# Patient Record
Sex: FEMALE | Race: BLACK OR AFRICAN AMERICAN | Employment: FULL TIME | URBAN - METROPOLITAN AREA
[De-identification: names, ages, dates, MRNs, and addresses within clinical notes are randomized per-mention and may not be internally consistent; named-entity substitution may affect disease eponyms.]

---

## 2017-05-17 ENCOUNTER — HOSPITAL ENCOUNTER (OUTPATIENT)
Dept: PHYSICAL THERAPY | Age: 52
Discharge: HOME | End: 2017-05-17
Payer: COMMERCIAL

## 2017-05-17 PROCEDURE — 97161 PT EVAL LOW COMPLEX 20 MIN: CPT

## 2017-05-17 PROCEDURE — 97140 MANUAL THERAPY 1/> REGIONS: CPT

## 2017-05-17 NOTE — PROGRESS NOTES
Oma Phan  : 1965 [facility] at [address]  [phone]   [phone]        OUTPATIENT PHYSICAL THERAPY:Initial Assessment 2017    ICD-10: Treatment Diagnosis:   Pain in RIGHT Leg (M79.604)  Pain in LEFT Leg (M79.605)  Difficulty in walking, not elsewhere classified (R26.2)  Precautions/Allergies:   Norvasc [amlodipine] and Pcn [penicillins]   Fall Risk Score: 1 (? 5 = High Risk)  MD Orders: Evaluate and treat MEDICAL/REFERRING DIAGNOSIS:  Spondylolisthesis, site unspecified [M43.10]  Spinal stenosis, lumbar region [M48.06]   DATE OF ONSET: 17, symptoms since 2016  REFERRING PHYSICIAN: Fercho George  RETURN PHYSICIAN APPOINTMENT: May 30, 2017   Patient has attended 1 physical therapy session including initial evaluation. INITIAL ASSESSMENT:  Ms. Walterine Meigs presents with increased pain bilateral hamstring musculature during sit to stand transfer. She notes pain is slowly improving. She presents with postural deviations listed below. She notes decreased pain with manual therapy to bilateral gluteus minims and hamstring musculature this date. She would benefit from skilled physical therapy in order to restore prior level of function and prevent future impairment. PROBLEM LIST (Impacting functional limitations):  1. Decreased Transfer Abilities  2. Decreased Ambulation Ability/Technique  3. Decreased Balance  4. Increased Pain  5. Decreased Activity Tolerance  6. Decreased Pacing Skills  7. Decreased Work Simplification/Energy Conservation Techniques  8. Decreased Flexibility/Joint Mobility  9. Decreased [place] with Home Exercise Program INTERVENTIONS PLANNED:  1. Balance Exercise  2. Bed Mobility  3. Cold  4. Electrical Stimulation  5. Family Education  6. Gait Training  7. Heat  8. Home Exercise Program (HEP)  9. Manual Therapy  10.  Neuromuscular Re-education/Strengthening  11. Range of Motion (ROM)  12. Therapeutic Activites  13. Therapeutic Exercise/Strengthening  14. Transfer Training  15. body mechanics training   TREATMENT PLAN:  Effective Dates: 5/17/17 TO 8/17/17. Frequency/Duration: 2 times a week for 6 weeks  GOALS: (Goals have been discussed and agreed upon with patient.)  Discharge Goals: Time Frame: 6 weeks  1. Patient will be independent with home exercise program within 6 weeks in order to improve independence with management of patient's symptoms and/or functional deficits. 2. Patient will improve their Modified Oswestry Low Back Pain Questionnaire score by 6 points (9/50) within 6 weeks in order to demonstrate a minimally clinically important difference with function during instrumental activities of daily living and mobility. 3. Patient will improve their Lower Extremity Functional Scale score by 9 scale points (53/80) within 6 weeks in order to demonstrate a minimally clinically important difference with improved function during instrumental activities of daily living and mobility. Rehabilitation Potential For Stated Goals: Good  Regarding [address] Hagen's therapy, I certify that the treatment plan above will be carried out by a therapist or under their direction. Thank you for this referral,  Favian Soliman PT     Referring Physician Signature: ELI Kelley              Date                    The information in this section was collected on 5/17/17 (except where otherwise noted). HISTORY:   History of Present Injury/Illness (Reason for Referral): Jesús Coy notes rash on face. She took spironolactone for rash. Notes decreased strength, tingling in bilateral hands, and pain in bilateral legs and back.    Past Medical History/Comorbidities: hypertension, uterine fibroid, hysterectomy, scoliosis  Social History/Living Environment:   Jesús Coy lives alone, she has 2 steps to enter house, one story house  Prior Level of Function/Work/Activity:  Waldemar Ramos works full time, job requirements prolonged sitting, computer use, walking  Personal Factors:          Sex:  female        Age:  45 y.o. Current Medications:  Lisinopril-hydrochlorothiazide, zithromax, aleve  Date Last Reviewed:  5/17/2017   Number of Personal Factors/Comorbidities that affect the Plan of Care: 1-2: MODERATE COMPLEXITY   EXAMINATION:   Observation/Orthostatic Postural Assessment:  Assessed 5/17/17  Waldemar Ramos sits with forward head and rounded shoulders which indicate tight anterior chest musculature, upper trapezius, and levator scapula and weak posterior scapula musculature and deep cervical flexors. She stands with weight shifted to left lower extremity with right knee flexed. Symmetrical pelvic landmarks in standing.  Right lower extremity longer in supine and long sitting   Palpation:  Assessed 5/17/17   Jesús Street is tender to palpate bilateral gluteus minimus, bilateral medial hamstring musculature, and right lateral hamstring musculature  ROM:  Assessed 5/17/17  Will continue to assess in more detail - within functional limits lumbar region and bilateral lower extremities   Strength:  Assessed 5/17/17  4/5 all major muscle groups bilateral lower extremities   Special Tests:  Assessed 5/17/17    slump test - negative bilaterally  Straight leg raise - negative bilaterally   Neurological Screen: Assessed 5/17/17        Myotomes: strong and symmetrical bilateral lower extremity         Dermatomes:  No numbness or tingling noted bilateral lower extremity         Reflexes: unable to elicit bilateral quadriceps and achilles tendon  Functional Mobility: Assessed 5/17/17 Matt [address] is modified independent with sit to stand transfer due to increased pain bilateral posterior thighs  Balance:  Assessed 5/17/17 good dynamic standing balance Mental Status:  Assessed 5/17/17  Alert and oriented x 4  Vitals: Assessed 5/17/17     127/81mmHg      Body Structures Involved:  1. Joints  2. Muscles  3. Ligaments Body Functions Affected:  1. Sensory/Pain  2. Neuromusculoskeletal  3. Movement Related  4. Skin Related Activities and Participation Affected:  1. General Tasks and Demands  2. Mobility  3. Self Care  4. Domestic Life  5. Interpersonal Interactions and Relationships  6. Community, Social and [place]   Number of elements that affect the Plan of Care: 3: MODERATE COMPLEXITY   CLINICAL PRESENTATION:   Presentation: Stable and uncomplicated: LOW COMPLEXITY   CLINICAL DECISION MAKING:   Outcome Measure: Assessed 5/17/17  Tool Used: Lower Extremity Functional Scale (LEFS)  Score:  Initial: 44/80    Interpretation of Score: 20 questions each scored on a 5 point scale with 0 representing \"extreme difficulty or unable to perform\" and 4 representing \"no difficulty\". The lower the score, the greater the functional disability. 80/80 represents no disability. Minimal detectable change is 9 points. Score 80 79-63 62-48 47-32 31-16 15-1 0   Modifier CH CI CJ CK CL CM CN     Tool Used: Modified Oswestry Low Back Pain Questionnaire  Score:  Initial: 15/50     Interpretation of Score: Each section is scored on a 0-5 scale, 5 representing the greatest disability. The scores of each section are added together for a total score of 50. Score 0 1-10 11-20 21-30 31-40 41-49 50   Modifier CH CI CJ CK CL CM CN     Medical Necessity:   · Patient is expected to demonstrate progress in range of motion, coordination and functional technique to increase independence with pain free functional mobility. · Patient demonstrates good rehab potential due to higher previous functional level. Reason for Services/Other Comments:  · Patient continues to require skilled intervention due to increased pain with sit to stand transfer.    Use of outcome tool(s) and clinical judgement create a POC that gives a: Clear prediction of patient's progress: LOW COMPLEXITY            TREATMENT:   (In addition to Assessment/Re-Assessment sessions the following treatments were rendered)  Pre-treatment Symptoms/Complaints: Arabella Jarrett notes 7/10 pain bilateral posterior thighs with sit to stand transfer. Pain: Initial:   Pain Intensity 1: 7  Pain Location 1: Hip, Leg  Pain Orientation 1: Right, Left, Lower  Pain Intervention(s) 1: Exercise, Therapeutic touch     Evaluation: 35 minutes    (In addition to Assessment/Re-Assessment sessions the following treatments were rendered)  Manual Therapy (    Soft Tissue Mobilization Duration  Duration: 11 Minutes):  Functional positional release to bilateral gluteus minimus, hamstring musculature for improved mobility and decreased pain   Soft tissue mobilization to bilateral gluteal musculature and hamstring musculature with foam roller for improved mobility and decreased pain     Therapeutic Modalities:                                                                                                 Treatment/Session Assessment:    Response to Treatment:  Arabella Jarrett notes 4/10 pain in posterior bilateral lower extremities following manual therapy this date  Pain: Post Treatment Session: 4/10  · Compliance with Program/Exercises: Will assess as treatment progresses. · Recommendations/Intent for next treatment session: \"Next visit will focus on advancements to more challenging activities and manual therapy for improved mobility and decreased pain \".   Total Treatment Duration:  PT Patient Time In/Time Out  Time In: 1440  Time Out: Monique 91, PT

## 2017-05-17 NOTE — PROGRESS NOTES
Ambulatory/Rehab Services H2 Model Falls Risk Assessment    Risk Factor Pts. ·   Confusion/Disorientation/Impulsivity  []    4 ·   Symptomatic Depression  []   2 ·   Altered Elimination  []   1 ·   Dizziness/Vertigo  []   1 ·   Gender (Male)  []   1 ·   Any administered antiepileptics (anticonvulsants):  []   2 ·   Any administered benzodiazepines:  []   1 ·   Visual Impairment (specify):  []   1 ·   Portable Oxygen Use  []   1 ·   Orthostatic ? BP  []   1 ·   History of Recent Falls (within 3 mos.)  []   5     Ability to Rise from Chair (choose one) Pts. ·   Ability to rise in a single movement  []   0 ·   Pushes up, successful in one attempt  [x]   1 ·   Multiple attempts, but successful  []   3 ·   Unable to rise without assistance  []   4   Total: (5 or greater = High Risk) 1     Falls Prevention Plan:   []                Physical Limitations to Exercise (specify):   []                Mobility Assistance Device (type):   []                Exercise/Equipment Adaptation (specify):    ©2010 [facility] of [address] Patent #[id number].  Federal Law prohibits the replication, distribution or use without written permission from [facility] of [address] [place] [address]  5/17/2017

## 2017-05-22 ENCOUNTER — HOSPITAL ENCOUNTER (OUTPATIENT)
Dept: PHYSICAL THERAPY | Age: 52
Discharge: HOME | End: 2017-05-22
Payer: COMMERCIAL

## 2017-05-22 PROCEDURE — 97110 THERAPEUTIC EXERCISES: CPT

## 2017-05-22 PROCEDURE — 97140 MANUAL THERAPY 1/> REGIONS: CPT

## 2017-05-22 NOTE — PROGRESS NOTES
Anjali Aceves  : 1965 [facility] at [place] [address] [address] [facility]  [phone]   [phone]        OUTPATIENT PHYSICAL THERAPY: Daily Note 2017    ICD-10: Treatment Diagnosis:   Pain in RIGHT Leg (M79.604)  Pain in LEFT Leg (M79.605)  Difficulty in walking, not elsewhere classified (R26.2)  Precautions/Allergies:   Norvasc [amlodipine] and Pcn [penicillins]   Fall Risk Score: 1 (? 5 = High Risk)  MD Orders: Evaluate and treat MEDICAL/REFERRING DIAGNOSIS:  Spondylolisthesis, site unspecified [M43.10]  Spinal stenosis, lumbar region [M48.06]   DATE OF ONSET: 17, symptoms since 2016  REFERRING PHYSICIAN: Poli Colemanma  RETURN PHYSICIAN APPOINTMENT: May 30, 2017   Patient has attended 2 physical therapy sessions including initial evaluation. INITIAL ASSESSMENT:  Ms. Velez Mo presents with increased pain bilateral hamstring musculature during sit to stand transfer. She notes pain is slowly improving. She presents with postural deviations listed below. She notes decreased pain with manual therapy to bilateral gluteus minims and hamstring musculature this date. She would benefit from skilled physical therapy in order to restore prior level of function and prevent future impairment. PROBLEM LIST (Impacting functional limitations):  1. Decreased Transfer Abilities  2. Decreased Ambulation Ability/Technique  3. Decreased Balance  4. Increased Pain  5. Decreased Activity Tolerance  6. Decreased Pacing Skills  7. Decreased Work Simplification/Energy Conservation Techniques  8. Decreased Flexibility/Joint Mobility  9. Decreased [place] with Home Exercise Program INTERVENTIONS PLANNED:  1. Balance Exercise  2. Bed Mobility  3. Cold  4. Electrical Stimulation  5. Family Education  6. Gait Training  7. Heat  8. Home Exercise Program (HEP)  9. Manual Therapy  10. Neuromuscular Re-education/Strengthening  11.  Range of Motion (ROM)  12. Therapeutic Activites  13. Therapeutic Exercise/Strengthening  14. Transfer Training  15. body mechanics training   TREATMENT PLAN:  Effective Dates: 5/17/17 TO 8/17/17. Frequency/Duration: 2 times a week for 6 weeks  GOALS: (Goals have been discussed and agreed upon with patient.)  Discharge Goals: Time Frame: 6 weeks  1. Patient will be independent with home exercise program within 6 weeks in order to improve independence with management of patient's symptoms and/or functional deficits. 2. Patient will improve their Modified Oswestry Low Back Pain Questionnaire score by 6 points (9/50) within 6 weeks in order to demonstrate a minimally clinically important difference with function during instrumental activities of daily living and mobility. 3. Patient will improve their Lower Extremity Functional Scale score by 9 scale points (53/80) within 6 weeks in order to demonstrate a minimally clinically important difference with improved function during instrumental activities of daily living and mobility. Rehabilitation Potential For Stated Goals: Good               The information in this section was collected on 5/17/17 (except where otherwise noted). HISTORY:   History of Present Injury/Illness (Reason for Referral): Jericatony Rhodesisaura [address] [place] [address] notes rash on face. She took spironolactone for rash. Notes decreased strength, tingling in bilateral hands, and pain in bilateral legs and back. Past Medical History/Comorbidities: hypertension, uterine fibroid, hysterectomy, scoliosis  Social History/Living Environment:   Travis Torres lives alone, she has 2 steps to enter house, one story house  Prior Level of Function/Work/Activity:  Travis Torres works full time, job requirements prolonged sitting, computer use, walking  Personal Factors:          Sex:  female        Age:  45 y.o.   Current Medications:  Lisinopril-hydrochlorothiazide, zithromax, aleve  Date Last Reviewed:  5/22/2017   Number of Personal Factors/Comorbidities that affect the Plan of Care: 1-2: MODERATE COMPLEXITY   EXAMINATION:   Observation/Orthostatic Postural Assessment:  Assessed 5/17/17  Arden Babcock sits with forward head and rounded shoulders which indicate tight anterior chest musculature, upper trapezius, and levator scapula and weak posterior scapula musculature and deep cervical flexors. She stands with weight shifted to left lower extremity with right knee flexed. Symmetrical pelvic landmarks in standing. Right lower extremity longer in supine and long sitting   Palpation:  Assessed 5/17/17   Jesús Byrd is tender to palpate bilateral gluteus minimus, bilateral medial hamstring musculature, and right lateral hamstring musculature  ROM:  Assessed 5/17/17  Will continue to assess in more detail - within functional limits lumbar region and bilateral lower extremities   Strength:  Assessed 5/17/17  4/5 all major muscle groups bilateral lower extremities   Special Tests:  Assessed 5/17/17    slump test - negative bilaterally  Straight leg raise - negative bilaterally   Neurological Screen: Assessed 5/17/17        Myotomes: strong and symmetrical bilateral lower extremity         Dermatomes:  No numbness or tingling noted bilateral lower extremity         Reflexes: unable to elicit bilateral quadriceps and achilles tendon  Functional Mobility: Assessed 5/17/17 Jesús Byrd is modified independent with sit to stand transfer due to increased pain bilateral posterior thighs  Balance:  Assessed 5/17/17 good dynamic standing balance   Mental Status:  Assessed 5/17/17  Alert and oriented x 4  Vitals: Assessed 5/17/17     127/81mmHg      Body Structures Involved:  1. Joints  2. Muscles  3. Ligaments Body Functions Affected:  1. Sensory/Pain  2. Neuromusculoskeletal  3. Movement Related  4.  Skin Related Activities and Participation Affected:  1. General Tasks and Demands  2. Mobility  3. Self Care  4. Domestic Life  5. Interpersonal Interactions and Relationships  6. Community, Social and [place]   Number of elements that affect the Plan of Care: 3: MODERATE COMPLEXITY   CLINICAL PRESENTATION:   Presentation: Stable and uncomplicated: LOW COMPLEXITY   CLINICAL DECISION MAKING:   Outcome Measure: Assessed 5/17/17  Tool Used: Lower Extremity Functional Scale (LEFS)  Score:  Initial: 44/80    Interpretation of Score: 20 questions each scored on a 5 point scale with 0 representing \"extreme difficulty or unable to perform\" and 4 representing \"no difficulty\". The lower the score, the greater the functional disability. 80/80 represents no disability. Minimal detectable change is 9 points. Score 80 79-63 62-48 47-32 31-16 15-1 0   Modifier CH CI CJ CK CL CM CN     Tool Used: Modified Oswestry Low Back Pain Questionnaire  Score:  Initial: 15/50     Interpretation of Score: Each section is scored on a 0-5 scale, 5 representing the greatest disability. The scores of each section are added together for a total score of 50. Score 0 1-10 11-20 21-30 31-40 41-49 50   Modifier CH CI CJ CK CL CM CN     Medical Necessity:   · Patient is expected to demonstrate progress in range of motion, coordination and functional technique to increase independence with pain free functional mobility. · Patient demonstrates good rehab potential due to higher previous functional level. Reason for Services/Other Comments:  · Patient continues to require skilled intervention due to increased pain with sit to stand transfer. Use of outcome tool(s) and clinical judgement create a POC that gives a: Clear prediction of patient's progress: LOW COMPLEXITY            TREATMENT:   (In addition to Assessment/Re-Assessment sessions the following treatments were rendered)  Pre-treatment Symptoms/Complaints:   Jorge Orantes notes 8/10 pain bilateral posterior thighs with sit to stand transfer. (right more than left)  Pain: Initial:   Pain Intensity 1: 8  Pain Location 1: Leg  Pain Orientation 1: Right  Pain Intervention(s) 1: Exercise, Therapeutic touch     (In addition to Assessment/Re-Assessment sessions the following treatments were rendered)  Therapeutic Exercise: (27 Minutes):  Exercises per grid below to improve mobility, strength and coordination. Required minimal visual and verbal cues to promote proper body alignment and promote proper body posture. Progressed complexity of movement as indicated. 1. Supine single knee to chest for improved mobility and decreased pain 3 sets 30 seconds bilaterally    2. Supine piriformis stretch foot on opposite knee 3 sets 30 seconds bilaterally     3. Supine bridges for improved strength 2 sets of 10 repetitions    4. NuStep for close chain strengthening bilateral lower extremities 8 minutes level 4    5. Body mechanics with sitting posture when at work - lumbar support, sit back against chair (not on edge), feet on floor    Manual Therapy (    Soft Tissue Mobilization Duration  Duration: 13 Minutes):  Functional positional release to bilateral gluteus minimus, hamstring musculature for improved mobility and decreased pain   Soft tissue mobilization to bilateral gluteal musculature and hamstring musculature with foam roller for improved mobility and decreased pain     Therapeutic Modalities:                                                                                                 Treatment/Session Assessment:    Response to Treatment:  Aleisha Rayo notes 6/10 pain in posterior bilateral lower extremities with improved sit to stand transfer  Pain: Post Treatment Session: 6/10  · Compliance with Program/Exercises: Will assess as treatment progresses. · Recommendations/Intent for next treatment session:  \"Next visit will focus on advancements to more challenging activities and manual therapy for improved mobility and decreased pain \".   Total Treatment Duration:  PT Patient Time In/Time Out  Time In: 1440  Time Out: 91 Natalieta Umu, PT

## 2017-05-25 ENCOUNTER — HOSPITAL ENCOUNTER (OUTPATIENT)
Dept: PHYSICAL THERAPY | Age: 52
Discharge: HOME | End: 2017-05-25
Payer: COMMERCIAL

## 2017-05-25 PROCEDURE — 97140 MANUAL THERAPY 1/> REGIONS: CPT

## 2017-05-25 NOTE — PROGRESS NOTES
Magda Zaidi  : 1965 [facility] [address]  [phone]   [phone]        OUTPATIENT PHYSICAL THERAPY: Daily Note 2017    ICD-10: Treatment Diagnosis:   Pain in RIGHT Leg (M79.604)  Pain in LEFT Leg (M79.605)  Difficulty in walking, not elsewhere classified (R26.2)  Precautions/Allergies:   Norvasc [amlodipine] and Pcn [penicillins]   Fall Risk Score: 1 (? 5 = High Risk)  MD Orders: Evaluate and treat MEDICAL/REFERRING DIAGNOSIS:  Spondylolisthesis, site unspecified [M43.10]  Spinal stenosis, lumbar region [M48.06]   DATE OF ONSET: 17, symptoms since 2016  REFERRING PHYSICIAN: Fercho Dos Santos  RETURN PHYSICIAN APPOINTMENT: May 30, 2017   Patient has attended 2 physical therapy sessions including initial evaluation. INITIAL ASSESSMENT:  Ms. Hernandez Escobar presents with increased pain bilateral hamstring musculature during sit to stand transfer. She notes pain is slowly improving. She presents with postural deviations listed below. She notes decreased pain with manual therapy to bilateral gluteus minims and hamstring musculature this date. She would benefit from skilled physical therapy in order to restore prior level of function and prevent future impairment. PROBLEM LIST (Impacting functional limitations):  1. Decreased Transfer Abilities  2. Decreased Ambulation Ability/Technique  3. Decreased Balance  4. Increased Pain  5. Decreased Activity Tolerance  6. Decreased Pacing Skills  7. Decreased Work Simplification/Energy Conservation Techniques  8. Decreased Flexibility/Joint Mobility  9. Decreased [place] with Home Exercise Program INTERVENTIONS PLANNED:  1. Balance Exercise  2. Bed Mobility  3. Cold  4. Electrical Stimulation  5. Family Education  6. Gait Training  7. Heat  8. Home Exercise Program (HEP)  9. Manual Therapy  10. Neuromuscular Re-education/Strengthening  11.  Range of Motion (ROM)  12. Therapeutic Activites  13. Therapeutic Exercise/Strengthening  14. Transfer Training  15. body mechanics training   TREATMENT PLAN:  Effective Dates: 5/17/17 TO 8/17/17. Frequency/Duration: 2 times a week for 6 weeks  GOALS: (Goals have been discussed and agreed upon with patient.)  Discharge Goals: Time Frame: 6 weeks  1. Patient will be independent with home exercise program within 6 weeks in order to improve independence with management of patient's symptoms and/or functional deficits. 2. Patient will improve their Modified Oswestry Low Back Pain Questionnaire score by 6 points (9/50) within 6 weeks in order to demonstrate a minimally clinically important difference with function during instrumental activities of daily living and mobility. 3. Patient will improve their Lower Extremity Functional Scale score by 9 scale points (53/80) within 6 weeks in order to demonstrate a minimally clinically important difference with improved function during instrumental activities of daily living and mobility. Rehabilitation Potential For Stated Goals: Good               The information in this section was collected on 5/17/17 (except where otherwise noted). HISTORY:   History of Present Injury/Illness (Reason for Referral): Jesús Andisaura [address] [place] [address] notes rash on face. She took spironolactone for rash. Notes decreased strength, tingling in bilateral hands, and pain in bilateral legs and back. Past Medical History/Comorbidities: hypertension, uterine fibroid, hysterectomy, scoliosis  Social History/Living Environment:   Radha Gorman lives alone, she has 2 steps to enter house, one story house  Prior Level of Function/Work/Activity:  Radha Gorman works full time, job requirements prolonged sitting, computer use, walking  Personal Factors:          Sex:  female        Age:  45 y.o.   Current Medications:  Lisinopril-hydrochlorothiazide, zithromax, aleve  Date Last Reviewed:  5/25/2017   Number of Personal Factors/Comorbidities that affect the Plan of Care: 1-2: MODERATE COMPLEXITY   EXAMINATION:   Observation/Orthostatic Postural Assessment:  Assessed 5/17/17  Arabella Jarrett sits with forward head and rounded shoulders which indicate tight anterior chest musculature, upper trapezius, and levator scapula and weak posterior scapula musculature and deep cervical flexors. She stands with weight shifted to left lower extremity with right knee flexed. Symmetrical pelvic landmarks in standing. Right lower extremity longer in supine and long sitting   Palpation:  Assessed 5/17/17   Jesús Carreon is tender to palpate bilateral gluteus minimus, bilateral medial hamstring musculature, and right lateral hamstring musculature  ROM:  Assessed 5/17/17  Will continue to assess in more detail - within functional limits lumbar region and bilateral lower extremities   Strength:  Assessed 5/17/17  4/5 all major muscle groups bilateral lower extremities   Special Tests:  Assessed 5/17/17    slump test - negative bilaterally  Straight leg raise - negative bilaterally   Neurological Screen: Assessed 5/17/17        Myotomes: strong and symmetrical bilateral lower extremity         Dermatomes:  No numbness or tingling noted bilateral lower extremity         Reflexes: unable to elicit bilateral quadriceps and achilles tendon  Functional Mobility: Assessed 5/17/17 Jesús Carreon is modified independent with sit to stand transfer due to increased pain bilateral posterior thighs  Balance:  Assessed 5/17/17 good dynamic standing balance   Mental Status:  Assessed 5/17/17  Alert and oriented x 4  Vitals: Assessed 5/17/17     127/81mmHg      Body Structures Involved:  1. Joints  2. Muscles  3. Ligaments Body Functions Affected:  1. Sensory/Pain  2. Neuromusculoskeletal  3. Movement Related  4.  Skin Related Activities and Participation Affected:  1. General Tasks and Demands  2. Mobility  3. Self Care  4. Domestic Life  5. Interpersonal Interactions and Relationships  6. Community, Social and [place]   Number of elements that affect the Plan of Care: 3: MODERATE COMPLEXITY   CLINICAL PRESENTATION:   Presentation: Stable and uncomplicated: LOW COMPLEXITY   CLINICAL DECISION MAKING:   Outcome Measure: Assessed 5/17/17  Tool Used: Lower Extremity Functional Scale (LEFS)  Score:  Initial: 44/80    Interpretation of Score: 20 questions each scored on a 5 point scale with 0 representing \"extreme difficulty or unable to perform\" and 4 representing \"no difficulty\". The lower the score, the greater the functional disability. 80/80 represents no disability. Minimal detectable change is 9 points. Score 80 79-63 62-48 47-32 31-16 15-1 0   Modifier CH CI CJ CK CL CM CN     Tool Used: Modified Oswestry Low Back Pain Questionnaire  Score:  Initial: 15/50     Interpretation of Score: Each section is scored on a 0-5 scale, 5 representing the greatest disability. The scores of each section are added together for a total score of 50. Score 0 1-10 11-20 21-30 31-40 41-49 50   Modifier CH CI CJ CK CL CM CN     Medical Necessity:   · Patient is expected to demonstrate progress in range of motion, coordination and functional technique to increase independence with pain free functional mobility. · Patient demonstrates good rehab potential due to higher previous functional level. Reason for Services/Other Comments:  · Patient continues to require skilled intervention due to increased pain with sit to stand transfer. Use of outcome tool(s) and clinical judgement create a POC that gives a: Clear prediction of patient's progress: LOW COMPLEXITY            TREATMENT:   (In addition to Assessment/Re-Assessment sessions the following treatments were rendered)  Pre-treatment Symptoms/Complaints:   Jitendra Watts notes 7/10 pain bilateral posterior thighs and buttocks. Patient reports with sit to stand transfer. (right more than left). Patient states that since last visit her pain has been generally decreased and she is able to get up and down with less difficulty. Sitting continues to be irritating. Pain: Initial: 7/10        (In addition to Assessment/Re-Assessment sessions the following treatments were rendered)  Therapeutic Exercise: ( 0 ):  Exercises per grid below to improve mobility, strength and coordination. Required minimal visual and verbal cues to promote proper body alignment and promote proper body posture. Progressed complexity of movement as indicated. 1. Supine single knee to chest for improved mobility and decreased pain 3 sets 30 seconds bilaterally    2. Supine piriformis stretch foot on opposite knee 3 sets 30 seconds bilaterally     3. Supine bridges for improved strength 2 sets of 10 repetitions    4. NuStep for close chain strengthening bilateral lower extremities 8 minutes level 4    5. Body mechanics with sitting posture when at work - lumbar support, sit back against chair (not on edge), feet on floor    Patient instructed to continue with HEP as instructed previous treatments. Manual Therapy (     40 minutes):  Patient prone for STM with hands and quasha tools to bilateral hamstrings, medial thighs, bilateral gluts/piriformis to help decrease soft tissue tightness, improve mobility and decrease pain. Palpable tenderness more in right than left hamstring and piriformis areas and medial right thigh. Decreased tenderness reported during treatment. Therapeutic Modalities: (10 minutes) with patient prone moist heat to bilateral gluts and hamstrings to help increase blood flow and decrease tightness prior to manual. After manual (10 minutes) ice pack to bilateral hamstrings with patient prone to help decrease inflammation, edema and pain.              t Treatment/Session Assessment:    Response to Treatment:  Brian Zhang Patient reported decreased tenderness. Patient able to get up from sitting after treatment without increased pain and walking with bigger steps and faster pace. Continue manual therapy and stretching exercises as patient tolerates. Pain: Post Treatment Session: No number given  · Compliance with Program/Exercises: Will assess as treatment progresses. · Recommendations/Intent for next treatment session: \"Next visit will focus on advancements to more challenging activities and manual therapy for improved mobility and decreased pain \".   Total Treatment Duration:  PT Patient Time In/Time Out  Time In: 1430  Time Out: Nigel Alofrd, PTA

## 2017-05-30 ENCOUNTER — HOSPITAL ENCOUNTER (OUTPATIENT)
Dept: PHYSICAL THERAPY | Age: 52
Discharge: HOME | End: 2017-05-30
Payer: COMMERCIAL

## 2017-05-30 PROCEDURE — 97110 THERAPEUTIC EXERCISES: CPT

## 2017-05-30 PROCEDURE — 97140 MANUAL THERAPY 1/> REGIONS: CPT

## 2017-05-30 NOTE — PROGRESS NOTES
Magda Zaidi  : 1965 [facility] [address]  [phone]   [phone]        OUTPATIENT PHYSICAL THERAPY: Daily Note 2017    ICD-10: Treatment Diagnosis:   Pain in RIGHT Leg (M79.604)  Pain in LEFT Leg (M79.605)  Difficulty in walking, not elsewhere classified (R26.2)  Precautions/Allergies:   Norvasc [amlodipine] and Pcn [penicillins]   Fall Risk Score: 1 (? 5 = High Risk)  MD Orders: Evaluate and treat MEDICAL/REFERRING DIAGNOSIS:  Spondylolisthesis, site unspecified [M43.10]  Spinal stenosis, lumbar region [M48.06]   DATE OF ONSET: 17, symptoms since 2016  REFERRING PHYSICIAN: Fercho Dos Santos  RETURN PHYSICIAN APPOINTMENT: May 30, 2017   Patient has attended 2 physical therapy sessions including initial evaluation. INITIAL ASSESSMENT:  Ms. Hernandez Escobar presents with increased pain bilateral hamstring musculature during sit to stand transfer. She notes pain is slowly improving. She presents with postural deviations listed below. She notes decreased pain with manual therapy to bilateral gluteus minims and hamstring musculature this date. She would benefit from skilled physical therapy in order to restore prior level of function and prevent future impairment. PROBLEM LIST (Impacting functional limitations):  1. Decreased Transfer Abilities  2. Decreased Ambulation Ability/Technique  3. Decreased Balance  4. Increased Pain  5. Decreased Activity Tolerance  6. Decreased Pacing Skills  7. Decreased Work Simplification/Energy Conservation Techniques  8. Decreased Flexibility/Joint Mobility  9. Decreased [place] with Home Exercise Program INTERVENTIONS PLANNED:  1. Balance Exercise  2. Bed Mobility  3. Cold  4. Electrical Stimulation  5. Family Education  6. Gait Training  7. Heat  8. Home Exercise Program (HEP)  9. Manual Therapy  10. Neuromuscular Re-education/Strengthening  11.  Range of Motion (ROM)  12. Therapeutic Activites  13. Therapeutic Exercise/Strengthening  14. Transfer Training  15. body mechanics training   TREATMENT PLAN:  Effective Dates: 5/17/17 TO 8/17/17. Frequency/Duration: 2 times a week for 6 weeks  GOALS: (Goals have been discussed and agreed upon with patient.)  Discharge Goals: Time Frame: 6 weeks  1. Patient will be independent with home exercise program within 6 weeks in order to improve independence with management of patient's symptoms and/or functional deficits. 2. Patient will improve their Modified Oswestry Low Back Pain Questionnaire score by 6 points (9/50) within 6 weeks in order to demonstrate a minimally clinically important difference with function during instrumental activities of daily living and mobility. 3. Patient will improve their Lower Extremity Functional Scale score by 9 scale points (53/80) within 6 weeks in order to demonstrate a minimally clinically important difference with improved function during instrumental activities of daily living and mobility. Rehabilitation Potential For Stated Goals: Good               The information in this section was collected on 5/17/17 (except where otherwise noted). HISTORY:   History of Present Injury/Illness (Reason for Referral): Jericatony Andisaura [address] [place] [address] notes rash on face. She took spironolactone for rash. Notes decreased strength, tingling in bilateral hands, and pain in bilateral legs and back. Past Medical History/Comorbidities: hypertension, uterine fibroid, hysterectomy, scoliosis  Social History/Living Environment:   Elba Hamm lives alone, she has 2 steps to enter house, one story house  Prior Level of Function/Work/Activity:  Elba Job works full time, job requirements prolonged sitting, computer use, walking  Personal Factors:          Sex:  female        Age:  45 y.o.   Current Medications:  Lisinopril-hydrochlorothiazide, zithromax, aleve  Date Last Reviewed:  5/30/2017   Number of Personal Factors/Comorbidities that affect the Plan of Care: 1-2: MODERATE COMPLEXITY   EXAMINATION:   Observation/Orthostatic Postural Assessment:  Assessed 5/17/17  Miesha Kathleen sits with forward head and rounded shoulders which indicate tight anterior chest musculature, upper trapezius, and levator scapula and weak posterior scapula musculature and deep cervical flexors. She stands with weight shifted to left lower extremity with right knee flexed. Symmetrical pelvic landmarks in standing. Right lower extremity longer in supine and long sitting   Palpation:  Assessed 5/17/17   Jesús Campos is tender to palpate bilateral gluteus minimus, bilateral medial hamstring musculature, and right lateral hamstring musculature  ROM:  Assessed 5/17/17  Will continue to assess in more detail - within functional limits lumbar region and bilateral lower extremities   Strength:  Assessed 5/17/17  4/5 all major muscle groups bilateral lower extremities   Special Tests:  Assessed 5/17/17    slump test - negative bilaterally  Straight leg raise - negative bilaterally   Neurological Screen: Assessed 5/17/17        Myotomes: strong and symmetrical bilateral lower extremity         Dermatomes:  No numbness or tingling noted bilateral lower extremity         Reflexes: unable to elicit bilateral quadriceps and achilles tendon  Functional Mobility: Assessed 5/17/17 Jesús Campos is modified independent with sit to stand transfer due to increased pain bilateral posterior thighs  Balance:  Assessed 5/17/17 good dynamic standing balance   Mental Status:  Assessed 5/17/17  Alert and oriented x 4  Vitals: Assessed 5/17/17     127/81mmHg      Body Structures Involved:  1. Joints  2. Muscles  3. Ligaments Body Functions Affected:  1. Sensory/Pain  2. Neuromusculoskeletal  3. Movement Related  4.  Skin Related Activities and Participation Affected:  1. General Tasks and Demands  2. Mobility  3. Self Care  4. Domestic Life  5. Interpersonal Interactions and Relationships  6. Community, Social and [place] Independence   Number of elements that affect the Plan of Care: 3: MODERATE COMPLEXITY   CLINICAL PRESENTATION:   Presentation: Stable and uncomplicated: LOW COMPLEXITY   CLINICAL DECISION MAKING:   Outcome Measure: Assessed 5/17/17  Tool Used: Lower Extremity Functional Scale (LEFS)  Score:  Initial: 44/80    Interpretation of Score: 20 questions each scored on a 5 point scale with 0 representing \"extreme difficulty or unable to perform\" and 4 representing \"no difficulty\". The lower the score, the greater the functional disability. 80/80 represents no disability. Minimal detectable change is 9 points. Score 80 79-63 62-48 47-32 31-16 15-1 0   Modifier CH CI CJ CK CL CM CN     Tool Used: Modified Oswestry Low Back Pain Questionnaire  Score:  Initial: 15/50     Interpretation of Score: Each section is scored on a 0-5 scale, 5 representing the greatest disability. The scores of each section are added together for a total score of 50. Score 0 1-10 11-20 21-30 31-40 41-49 50   Modifier CH CI CJ CK CL CM CN     Medical Necessity:   · Patient is expected to demonstrate progress in range of motion, coordination and functional technique to increase independence with pain free functional mobility. · Patient demonstrates good rehab potential due to higher previous functional level. Reason for Services/Other Comments:  · Patient continues to require skilled intervention due to increased pain with sit to stand transfer. Use of outcome tool(s) and clinical judgement create a POC that gives a: Clear prediction of patient's progress: LOW COMPLEXITY            TREATMENT:   (In addition to Assessment/Re-Assessment sessions the following treatments were rendered)  Pre-treatment Symptoms/Complaints:   Riley Mary notes 6/10 pain bilateral posterior thighs and buttocks only when I am getting up and down or sitting for long periods. Still right buttocks and leg more than left. Pain: Initial: 6/10        (In addition to Assessment/Re-Assessment sessions the following treatments were rendered)  Therapeutic Exercise: ( 20 minutes ):  Exercises per grid below to improve mobility, strength and coordination. Required minimal visual and verbal cues to promote proper body alignment and promote proper body posture. Progressed complexity of movement as indicated. 1. Supine single knee to chest for improved mobility and decreased pain 3 sets 30 seconds bilaterally    2. Supine piriformis stretch foot on opposite knee 3 sets 30 seconds bilaterally     3. Supine bridges with core engaged for improved strength 2 sets of 10 repetitions    4. NuStep for close chain strengthening bilateral lower extremities 10 minutes level 3    5. Hamstring stretch with strap 3 x 30 second hold    Patient instructed to continue with HEP as instructed previous treatments. Handout on new hamstring exercise given to patient today. 5/30/17    Manual Therapy (      25 minutes):  Patient prone for STM with hands and quasha tools to right more than left gluts, piriformis, right quadratus, right latissimus and paraspinals of lumbar area along with myofascial release in the lumbar area. Also, STM with hands and quasha tools to right hamstring. All to help increase blood flow and decrease tightness and pain. Therapeutic Modalities: (10 minutes) with patient prone moist heat to bilateral gluts and hamstrings to help increase blood flow and decrease tightness following manual.            t                                                                                               Treatment/Session Assessment:  Patient tolerated exercises well without increased pain and understood all exercises well. Patient reports compliance with HEP.  Patient very tight in right glut/piriformis, lumbar paraspinals, right latissimus area and quadratus area. Some decreased palpable tightness and tenderness noted following treatment but need to focus on manual next visit. Patient instructed to continue with HEP and ice and heat at home. Patient understood and agreeable. Response to Treatment:  Jesús Hagen   Pain: Post Treatment Session: 0/10    · Compliance with Program/Exercises: Will assess as treatment progresses. · Recommendations/Intent for next treatment session: \"Next visit will focus on advancements to more challenging activities and manual therapy for improved mobility and decreased pain \".   Total Treatment Duration:  PT Patient Time In/Time Out  Time In: 0930  Time Out: [place], [place]

## 2017-06-02 ENCOUNTER — HOSPITAL ENCOUNTER (OUTPATIENT)
Dept: PHYSICAL THERAPY | Age: 52
Discharge: HOME | End: 2017-06-02
Payer: COMMERCIAL

## 2017-06-02 PROCEDURE — 97110 THERAPEUTIC EXERCISES: CPT

## 2017-06-02 PROCEDURE — 97140 MANUAL THERAPY 1/> REGIONS: CPT

## 2017-06-02 NOTE — PROGRESS NOTES
Kaveh Cabral  : 1965 [facility] [address]  [phone]   [phone]        OUTPATIENT PHYSICAL THERAPY: Daily Note 2017    ICD-10: Treatment Diagnosis:   Pain in RIGHT Leg (M79.604)  Pain in LEFT Leg (M79.605)  Difficulty in walking, not elsewhere classified (R26.2)  Precautions/Allergies:   Norvasc [amlodipine] and Pcn [penicillins]   Fall Risk Score: 1 (? 5 = High Risk)  MD Orders: Evaluate and treat MEDICAL/REFERRING DIAGNOSIS:  Spondylolisthesis, site unspecified [M43.10]  Spinal stenosis, lumbar region [M48.06]   DATE OF ONSET: 17, symptoms since 2016  REFERRING PHYSICIAN: Jeb [place], [place]  RETURN PHYSICIAN APPOINTMENT: May 30, 2017   Patient has attended 2 physical therapy sessions including initial evaluation. INITIAL ASSESSMENT:  Ms. Nikita Amaya presents with increased pain bilateral hamstring musculature during sit to stand transfer. She notes pain is slowly improving. She presents with postural deviations listed below. She notes decreased pain with manual therapy to bilateral gluteus minims and hamstring musculature this date. She would benefit from skilled physical therapy in order to restore prior level of function and prevent future impairment. PROBLEM LIST (Impacting functional limitations):  1. Decreased Transfer Abilities  2. Decreased Ambulation Ability/Technique  3. Decreased Balance  4. Increased Pain  5. Decreased Activity Tolerance  6. Decreased Pacing Skills  7. Decreased Work Simplification/Energy Conservation Techniques  8. Decreased Flexibility/Joint Mobility  9. Decreased [place] with Home Exercise Program INTERVENTIONS PLANNED:  1. Balance Exercise  2. Bed Mobility  3. Cold  4. Electrical Stimulation  5. Family Education  6. Gait Training  7. Heat  8. Home Exercise Program (HEP)  9. Manual Therapy  10. Neuromuscular Re-education/Strengthening  11.  Range of Motion (ROM)  12. Therapeutic Activites  13. Therapeutic Exercise/Strengthening  14. Transfer Training  15. body mechanics training   TREATMENT PLAN:  Effective Dates: 5/17/17 TO 8/17/17. Frequency/Duration: 2 times a week for 6 weeks  GOALS: (Goals have been discussed and agreed upon with patient.)  Discharge Goals: Time Frame: 6 weeks  1. Patient will be independent with home exercise program within 6 weeks in order to improve independence with management of patient's symptoms and/or functional deficits. 2. Patient will improve their Modified Oswestry Low Back Pain Questionnaire score by 6 points (9/50) within 6 weeks in order to demonstrate a minimally clinically important difference with function during instrumental activities of daily living and mobility. 3. Patient will improve their Lower Extremity Functional Scale score by 9 scale points (53/80) within 6 weeks in order to demonstrate a minimally clinically important difference with improved function during instrumental activities of daily living and mobility. Rehabilitation Potential For Stated Goals: Good               The information in this section was collected on 5/17/17 (except where otherwise noted). HISTORY:   History of Present Injury/Illness (Reason for Referral): Jesús Meaganisaura [address] [place] [address] notes rash on face. She took spironolactone for rash. Notes decreased strength, tingling in bilateral hands, and pain in bilateral legs and back. Past Medical History/Comorbidities: hypertension, uterine fibroid, hysterectomy, scoliosis  Social History/Living Environment:   Dhaval Infante lives alone, she has 2 steps to enter house, one story house  Prior Level of Function/Work/Activity:  Dhaval Infante works full time, job requirements prolonged sitting, computer use, walking  Personal Factors:          Sex:  female        Age:  45 y.o.   Current Medications:  Lisinopril-hydrochlorothiazide, zithromax, aleve  Date Last Reviewed:  6/2/2017   Number of Personal Factors/Comorbidities that affect the Plan of Care: 1-2: MODERATE COMPLEXITY   EXAMINATION:   Observation/Orthostatic Postural Assessment:  Assessed 5/17/17  Kaveh Cabral sits with forward head and rounded shoulders which indicate tight anterior chest musculature, upper trapezius, and levator scapula and weak posterior scapula musculature and deep cervical flexors. She stands with weight shifted to left lower extremity with right knee flexed. Symmetrical pelvic landmarks in standing. Right lower extremity longer in supine and long sitting   Palpation:  Assessed 5/17/17   Jesús Amaya is tender to palpate bilateral gluteus minimus, bilateral medial hamstring musculature, and right lateral hamstring musculature  ROM:  Assessed 5/17/17  Will continue to assess in more detail - within functional limits lumbar region and bilateral lower extremities   Strength:  Assessed 5/17/17  4/5 all major muscle groups bilateral lower extremities   Special Tests:  Assessed 5/17/17    slump test - negative bilaterally  Straight leg raise - negative bilaterally   Neurological Screen: Assessed 5/17/17        Myotomes: strong and symmetrical bilateral lower extremity         Dermatomes:  No numbness or tingling noted bilateral lower extremity         Reflexes: unable to elicit bilateral quadriceps and achilles tendon  Functional Mobility: Assessed 5/17/17 Jesús Amaya is modified independent with sit to stand transfer due to increased pain bilateral posterior thighs  Balance:  Assessed 5/17/17 good dynamic standing balance   Mental Status:  Assessed 5/17/17  Alert and oriented x 4  Vitals: Assessed 5/17/17     127/81mmHg      Body Structures Involved:  1. Joints  2. Muscles  3. Ligaments Body Functions Affected:  1. Sensory/Pain  2. Neuromusculoskeletal  3. Movement Related  4.  Skin Related Activities and Participation Affected:  1. General Tasks and Demands  2. Mobility  3. Self Care  4. Domestic Life  5. Interpersonal Interactions and Relationships  6. Community, Social and [place]   Number of elements that affect the Plan of Care: 3: MODERATE COMPLEXITY   CLINICAL PRESENTATION:   Presentation: Stable and uncomplicated: LOW COMPLEXITY   CLINICAL DECISION MAKING:   Outcome Measure: Assessed 5/17/17  Tool Used: Lower Extremity Functional Scale (LEFS)  Score:  Initial: 44/80    Interpretation of Score: 20 questions each scored on a 5 point scale with 0 representing \"extreme difficulty or unable to perform\" and 4 representing \"no difficulty\". The lower the score, the greater the functional disability. 80/80 represents no disability. Minimal detectable change is 9 points. Score 80 79-63 62-48 47-32 31-16 15-1 0   Modifier CH CI CJ CK CL CM CN     Tool Used: Modified Oswestry Low Back Pain Questionnaire  Score:  Initial: 15/50     Interpretation of Score: Each section is scored on a 0-5 scale, 5 representing the greatest disability. The scores of each section are added together for a total score of 50. Score 0 1-10 11-20 21-30 31-40 41-49 50   Modifier CH CI CJ CK CL CM CN     Medical Necessity:   · Patient is expected to demonstrate progress in range of motion, coordination and functional technique to increase independence with pain free functional mobility. · Patient demonstrates good rehab potential due to higher previous functional level. Reason for Services/Other Comments:  · Patient continues to require skilled intervention due to increased pain with sit to stand transfer. Use of outcome tool(s) and clinical judgement create a POC that gives a: Clear prediction of patient's progress: LOW COMPLEXITY            TREATMENT:   (In addition to Assessment/Re-Assessment sessions the following treatments were rendered)  Pre-treatment Symptoms/Complaints:   Jesús [employer] notes no pain in back and hip/leg today but left knee is bothering me again. Pain level in left knee is off and on and when standing for long periods pain level is 6/10 and it bee at times. Patient reports good response to treatment last visit with decreased pain. Patient states she had nerve conduction study on both hands yesterday and has carpel tunnel syndrome in both hands and going to discuss the results with the MD next week. Pain: Initial: 0/10 better with no pain in back or hip today        (In addition to Assessment/Re-Assessment sessions the following treatments were rendered)  Therapeutic Exercise: ( 25 minutes ):  Exercises per grid below to improve mobility, strength and coordination. Required minimal visual and verbal cues to promote proper body alignment and promote proper body posture. Progressed complexity of movement as indicated. 1. Supine single knee to chest for improved mobility and decreased pain 3 sets 30 seconds bilaterally    2. Supine piriformis stretch foot on opposite knee 3 sets 30 seconds bilaterally     3. Supine bridges with core engaged for improved strength 2 sets of 10 repetitions    4. NuStep for close chain strengthening bilateral lower extremities 10 minutes level 3    5. Hamstring stretch with strap 3 x 30 second hold    Patient instructed to continue with HEP as instructed previous treatments. Handout on new hamstring exercise given to patient. Manual Therapy (      15 minutes):  Patient supine with knee wedge in place for theraroller to left TFL and quad area to help increase blood flow and decrease tightness and pain.      Therapeutic Modalities: (10 minutes) with patient supine with knee wedge in place for moist heat to left TFL area to help decrease tightness prior to manual.            t                                                                                               Treatment/Session Assessment:  Patient treatment well today and instructed patient in sitting positions at home to help improve posture at rest with both arms propped. Patient understood and agreeable. Response to Treatment:  Srinivasan Dye reported no back or hip/leg pain with tenderness decreased in left TFL following treatment. Pain: Post Treatment Session: 0/10    · Compliance with Program/Exercises: Will assess as treatment progresses. · Recommendations/Intent for next treatment session: \"Next visit will focus on advancements to more challenging activities and manual therapy for improved mobility and decreased pain \".   Total Treatment Duration:  PT Patient Time In/Time Out  Time In: 0915  Time Out: [address], [place]

## 2017-06-09 ENCOUNTER — HOSPITAL ENCOUNTER (OUTPATIENT)
Dept: PHYSICAL THERAPY | Age: 52
Discharge: HOME | End: 2017-06-09
Payer: COMMERCIAL

## 2017-06-09 PROCEDURE — 97110 THERAPEUTIC EXERCISES: CPT

## 2017-06-09 PROCEDURE — 97140 MANUAL THERAPY 1/> REGIONS: CPT

## 2017-06-09 NOTE — PROGRESS NOTES
Oma Phan  : 1965 [facility] [address]  [phone]   [phone]        OUTPATIENT PHYSICAL THERAPY: Daily Note 2017    ICD-10: Treatment Diagnosis:   Pain in RIGHT Leg (M79.604)  Pain in LEFT Leg (M79.605)  Difficulty in walking, not elsewhere classified (R26.2)  Precautions/Allergies:   Norvasc [amlodipine] and Pcn [penicillins]   Fall Risk Score: 1 (? 5 = High Risk)  MD Orders: Evaluate and treat MEDICAL/REFERRING DIAGNOSIS:  Spondylolisthesis, site unspecified [M43.10]  Spinal stenosis, lumbar region [M48.06]   DATE OF ONSET: 17, symptoms since 2016  REFERRING PHYSICIAN: Fercho George  RETURN PHYSICIAN APPOINTMENT: May 30, 2017   Patient has attended 6 physical therapy sessions including initial evaluation. INITIAL ASSESSMENT:  Ms. Walterine Meigs presents with increased pain bilateral hamstring musculature during sit to stand transfer. She notes pain is slowly improving. She presents with postural deviations listed below. She notes decreased pain with manual therapy to bilateral gluteus minimus and hamstring musculature this date. She would benefit from skilled physical therapy in order to restore prior level of function and prevent future impairment. PROBLEM LIST (Impacting functional limitations):  1. Decreased Transfer Abilities  2. Decreased Ambulation Ability/Technique  3. Decreased Balance  4. Increased Pain  5. Decreased Activity Tolerance  6. Decreased Pacing Skills  7. Decreased Work Simplification/Energy Conservation Techniques  8. Decreased Flexibility/Joint Mobility  9. Decreased [place] with Home Exercise Program INTERVENTIONS PLANNED:  1. Balance Exercise  2. Bed Mobility  3. Cold  4. Electrical Stimulation  5. Family Education  6. Gait Training  7. Heat  8. Home Exercise Program (HEP)  9. Manual Therapy  10. Neuromuscular Re-education/Strengthening  11.  Range of Motion (ROM)  12. Therapeutic Activites  13. Therapeutic Exercise/Strengthening  14. Transfer Training  15. body mechanics training   TREATMENT PLAN:  Effective Dates: 5/17/17 TO 8/17/17. Frequency/Duration: 2 times a week for 6 weeks  GOALS: (Goals have been discussed and agreed upon with patient.)  Discharge Goals: Time Frame: 6 weeks  1. Patient will be independent with home exercise program within 6 weeks in order to improve independence with management of patient's symptoms and/or functional deficits. 2. Patient will improve their Modified Oswestry Low Back Pain Questionnaire score by 6 points (9/50) within 6 weeks in order to demonstrate a minimally clinically important difference with function during instrumental activities of daily living and mobility. 3. Patient will improve their Lower Extremity Functional Scale score by 9 scale points (53/80) within 6 weeks in order to demonstrate a minimally clinically important difference with improved function during instrumental activities of daily living and mobility. Rehabilitation Potential For Stated Goals: Good               The information in this section was collected on 5/17/17 (except where otherwise noted). HISTORY:   History of Present Injury/Illness (Reason for Referral): Jesús Rhodesisaura [address] [place] [address] notes rash on face. She took spironolactone for rash. Notes decreased strength, tingling in bilateral hands, and pain in bilateral legs and back. Past Medical History/Comorbidities: hypertension, uterine fibroid, hysterectomy, scoliosis  Social History/Living Environment:   Araeblla Jarrett lives alone, she has 2 steps to enter house, one story house  Prior Level of Function/Work/Activity:  Arabella Jarrett works full time, job requirements prolonged sitting, computer use, walking  Personal Factors:          Sex:  female        Age:  45 y.o.   Current Medications:  Lisinopril-hydrochlorothiazide, zithromax, aleve  Date Last Reviewed:  6/9/2017   Number of Personal Factors/Comorbidities that affect the Plan of Care: 1-2: MODERATE COMPLEXITY   EXAMINATION:   Observation/Orthostatic Postural Assessment:  Assessed 5/17/17  Adrien Santa sits with forward head and rounded shoulders which indicate tight anterior chest musculature, upper trapezius, and levator scapula and weak posterior scapula musculature and deep cervical flexors. She stands with weight shifted to left lower extremity with right knee flexed. Symmetrical pelvic landmarks in standing. Right lower extremity longer in supine and long sitting   Palpation:  Assessed 5/17/17   Jesús Castanon is tender to palpate bilateral gluteus minimus, bilateral medial hamstring musculature, and right lateral hamstring musculature  ROM:  Assessed 5/17/17  Will continue to assess in more detail - within functional limits lumbar region and bilateral lower extremities   Strength:  Assessed 5/17/17  4/5 all major muscle groups bilateral lower extremities   Special Tests:  Assessed 5/17/17    slump test - negative bilaterally  Straight leg raise - negative bilaterally   Neurological Screen: Assessed 5/17/17        Myotomes: strong and symmetrical bilateral lower extremity         Dermatomes:  No numbness or tingling noted bilateral lower extremity         Reflexes: unable to elicit bilateral quadriceps and achilles tendon  Functional Mobility: Assessed 5/17/17 Jesús Castanon is modified independent with sit to stand transfer due to increased pain bilateral posterior thighs  Balance:  Assessed 5/17/17 good dynamic standing balance   Mental Status:  Assessed 5/17/17  Alert and oriented x 4  Vitals: Assessed 5/17/17     127/81mmHg      Body Structures Involved:  1. Joints  2. Muscles  3. Ligaments Body Functions Affected:  1. Sensory/Pain  2. Neuromusculoskeletal  3. Movement Related  4.  Skin Related Activities and Participation Affected:  1. General Tasks and Demands  2. Mobility  3. Self Care  4. Domestic Life  5. Interpersonal Interactions and Relationships  6. Community, Social and [place]   Number of elements that affect the Plan of Care: 3: MODERATE COMPLEXITY   CLINICAL PRESENTATION:   Presentation: Stable and uncomplicated: LOW COMPLEXITY   CLINICAL DECISION MAKING:   Outcome Measure: Assessed 5/17/17  Tool Used: Lower Extremity Functional Scale (LEFS)  Score:  Initial: 44/80    Interpretation of Score: 20 questions each scored on a 5 point scale with 0 representing \"extreme difficulty or unable to perform\" and 4 representing \"no difficulty\". The lower the score, the greater the functional disability. 80/80 represents no disability. Minimal detectable change is 9 points. Score 80 79-63 62-48 47-32 31-16 15-1 0   Modifier CH CI CJ CK CL CM CN     Tool Used: Modified Oswestry Low Back Pain Questionnaire  Score:  Initial: 15/50     Interpretation of Score: Each section is scored on a 0-5 scale, 5 representing the greatest disability. The scores of each section are added together for a total score of 50. Score 0 1-10 11-20 21-30 31-40 41-49 50   Modifier CH CI CJ CK CL CM CN     Medical Necessity:   · Patient is expected to demonstrate progress in range of motion, coordination and functional technique to increase independence with pain free functional mobility. · Patient demonstrates good rehab potential due to higher previous functional level. Reason for Services/Other Comments:  · Patient continues to require skilled intervention due to increased pain with sit to stand transfer. Use of outcome tool(s) and clinical judgement create a POC that gives a: Clear prediction of patient's progress: LOW COMPLEXITY            TREATMENT:   (In addition to Assessment/Re-Assessment sessions the following treatments were rendered)  Pre-treatment Symptoms/Complaints:   Therisa Collar notes 6/10 pain in bilateral lower extremities and left knee  Pain: Initial: 6/10  Pain Intensity 1: 6  Pain Location 1: Leg  Pain Orientation 1: Right, Left  Pain Intervention(s) 1: Exercise, Therapeutic touch     (In addition to Assessment/Re-Assessment sessions the following treatments were rendered)  Therapeutic Exercise: (18 Minutes):  Exercises per grid below to improve mobility, strength and coordination. Required minimal visual and verbal cues to promote proper body alignment and promote proper body posture. Progressed complexity of movement as indicated. 1. Supine single knee to chest for improved mobility and decreased pain 3 sets 30 seconds bilaterally    2. Supine piriformis stretch foot on opposite knee 3 sets 30 seconds bilaterally     3. Supine bridges with core engaged for improved strength 2 sets of 8 repetitions    4. NuStep for close chain strengthening bilateral lower extremities 10 minutes level 3    5. Hamstring stretch with strap 3 x 30 second hold    6. Sidelying hip abduction strengthening with clamshells 20 repetitions bilaterally     Manual Therapy (    Soft Tissue Mobilization Duration  Duration: 24 Minutes )   Tool assisted soft tissue mobilization with foam roller to bilateral hamstrings, ITB, TFL, hamstrings, and gastroc for improved mobility and decreased pain     Therapeutic Modalities:                                                                                                Treatment/Session Assessment:    Response to Treatment:  Jesús Espinosa notes improved symptoms to 0/10 pain - she notes pain will increase once she sits still for period of time  Pain: Post Treatment Session: 0/10    · Compliance with Program/Exercises: Will assess as treatment progresses. · Recommendations/Intent for next treatment session: \"Next visit will focus on advancements to more challenging activities and manual therapy for improved mobility and decreased pain \".   Total Treatment Duration:  PT Patient Time In/Time Out  Time In: 0930  Time Out: [address], PT

## 2017-08-23 NOTE — PROGRESS NOTES
Regulo Grossman  : 1965 [facility] at [address]  [phone]   [phone]        OUTPATIENT PHYSICAL THERAPY: Discontinuation 2017    ICD-10: Treatment Diagnosis:   Pain in RIGHT Leg (M79.604)  Pain in LEFT Leg (M79.605)  Difficulty in walking, not elsewhere classified (R26.2)  Precautions/Allergies:   Norvasc [amlodipine] and Pcn [penicillins]   Fall Risk Score: 1 (? 5 = High Risk)  MD Orders: Evaluate and treat MEDICAL/REFERRING DIAGNOSIS:  Spondylolisthesis, site unspecified [M43.10]  Spinal stenosis, lumbar region [M48.06]   DATE OF ONSET: 17, symptoms since 2016  REFERRING PHYSICIAN: Ariana Upton, [address]  RETURN PHYSICIAN APPOINTMENT: May 30, 2017   Patient has attended 6 physical therapy sessions including initial evaluation. INITIAL ASSESSMENT:  Ms. Job Naqvis to be discontinued from physical therapy due to not attending since 17. She participated in therapeutic exercise and manual therapy as needed to help reduce symptoms. PROBLEM LIST (Impacting functional limitations):  1. Decreased Transfer Abilities  2. Decreased Ambulation Ability/Technique  3. Decreased Balance  4. Increased Pain  5. Decreased Activity Tolerance  6. Decreased Pacing Skills  7. Decreased Work Simplification/Energy Conservation Techniques  8. Decreased Flexibility/Joint Mobility  9. Decreased [place] with Home Exercise Program INTERVENTIONS PLANNED:  1. Balance Exercise  2. Bed Mobility  3. Cold  4. Electrical Stimulation  5. Family Education  6. Gait Training  7. Heat  8. Home Exercise Program (HEP)  9. Manual Therapy  10. Neuromuscular Re-education/Strengthening  11. Range of Motion (ROM)  12. Therapeutic Activites  13. Therapeutic Exercise/Strengthening  14. Transfer Training  15. body mechanics training   TREATMENT PLAN:  Effective Dates: 17 TO 17.   Frequency/Duration:   GOALS: (Goals have been discussed and agreed upon with patient.)  Discharge Goals: Time Frame: 6 weeks  1. Patient will be independent with home exercise program within 6 weeks in order to improve independence with management of patient's symptoms and/or functional deficits. (NOT MET due to NOT ASSESSED 8/23/17)  2. Patient will improve their Modified Oswestry Low Back Pain Questionnaire score by 6 points (9/50) within 6 weeks in order to demonstrate a minimally clinically important difference with function during instrumental activities of daily living and mobility. (NOT MET due to NOT ASSESSED 8/23/17)  3. Patient will improve their Lower Extremity Functional Scale score by 9 scale points (53/80) within 6 weeks in order to demonstrate a minimally clinically important difference with improved function during instrumental activities of daily living and mobility. (NOT MET Due to NOT ASSESSED 8/23/17)  Rehabilitation Potential For Stated Goals: Good               The information in this section was collected on 5/17/17 (except where otherwise noted). HISTORY:   History of Present Injury/Illness (Reason for Referral): Jesús Hernandez [address] [place] [address] notes rash on face. She took spironolactone for rash. Notes decreased strength, tingling in bilateral hands, and pain in bilateral legs and back. Past Medical History/Comorbidities: hypertension, uterine fibroid, hysterectomy, scoliosis  Social History/Living Environment:   Elba Hamm lives alone, she has 2 steps to enter house, one story house  Prior Level of Function/Work/Activity:  Elba Job works full time, job requirements prolonged sitting, computer use, walking  Personal Factors:          Sex:  female        Age:  45 y.o.   Current Medications:  Lisinopril-hydrochlorothiazide, zithromax, aleve  Date Last Reviewed:  8/23/2017   Number of Personal Factors/Comorbidities that affect the Plan of Care: 1-2: MODERATE COMPLEXITY   EXAMINATION: Observation/Orthostatic Postural Assessment:  Assessed 5/17/17  Pam Morales sits with forward head and rounded shoulders which indicate tight anterior chest musculature, upper trapezius, and levator scapula and weak posterior scapula musculature and deep cervical flexors. She stands with weight shifted to left lower extremity with right knee flexed. Symmetrical pelvic landmarks in standing. Right lower extremity longer in supine and long sitting   Palpation:  Assessed 5/17/17   Jesús Carrera is tender to palpate bilateral gluteus minimus, bilateral medial hamstring musculature, and right lateral hamstring musculature  ROM:  Assessed 5/17/17  Will continue to assess in more detail - within functional limits lumbar region and bilateral lower extremities   Strength:  Assessed 5/17/17  4/5 all major muscle groups bilateral lower extremities   Special Tests:  Assessed 5/17/17    slump test - negative bilaterally  Straight leg raise - negative bilaterally   Neurological Screen: Assessed 5/17/17        Myotomes: strong and symmetrical bilateral lower extremity         Dermatomes:  No numbness or tingling noted bilateral lower extremity         Reflexes: unable to elicit bilateral quadriceps and achilles tendon  Functional Mobility: Assessed 5/17/17 Jesús Carrera is modified independent with sit to stand transfer due to increased pain bilateral posterior thighs  Balance:  Assessed 5/17/17 good dynamic standing balance   Mental Status:  Assessed 5/17/17  Alert and oriented x 4  Vitals: Assessed 5/17/17     127/81mmHg      Body Structures Involved:  1. Joints  2. Muscles  3. Ligaments Body Functions Affected:  1. Sensory/Pain  2. Neuromusculoskeletal  3. Movement Related  4. Skin Related Activities and Participation Affected:  1. General Tasks and Demands  2. Mobility  3. Self Care  4. Domestic Life  5. Interpersonal Interactions and Relationships  6.  Community, Social and [place]   Number of elements that affect the Plan of Care: 3: MODERATE COMPLEXITY   CLINICAL PRESENTATION:   Presentation: Stable and uncomplicated: LOW COMPLEXITY   CLINICAL DECISION MAKING:   Outcome Measure: Assessed 5/17/17  Tool Used: Lower Extremity Functional Scale (LEFS)  Score:  Initial: 44/80    Interpretation of Score: 20 questions each scored on a 5 point scale with 0 representing \"extreme difficulty or unable to perform\" and 4 representing \"no difficulty\". The lower the score, the greater the functional disability. 80/80 represents no disability. Minimal detectable change is 9 points. Score 80 79-63 62-48 47-32 31-16 15-1 0   Modifier CH CI CJ CK CL CM CN     Tool Used: Modified Oswestry Low Back Pain Questionnaire  Score:  Initial: 15/50     Interpretation of Score: Each section is scored on a 0-5 scale, 5 representing the greatest disability. The scores of each section are added together for a total score of 50.     Score 0 1-10 11-20 21-30 31-40 41-49 50   Modifier CH CI CJ CK CL CM CN        Use of outcome tool(s) and clinical judgement create a POC that gives a: Clear prediction of patient's progress: LOW COMPLEXITY            TREATMENT:     Abdi Summers, PT

## 2018-02-09 PROBLEM — E66.01 OBESITY, MORBID (HCC): Status: ACTIVE | Noted: 2018-02-09

## 2022-03-19 PROBLEM — E66.01 OBESITY, MORBID (HCC): Status: ACTIVE | Noted: 2018-02-09

## 2022-06-03 ENCOUNTER — OFFICE VISIT (OUTPATIENT)
Dept: GYNECOLOGY | Age: 57
End: 2022-06-03
Payer: COMMERCIAL

## 2022-06-03 VITALS
HEIGHT: 64 IN | WEIGHT: 236 LBS | DIASTOLIC BLOOD PRESSURE: 78 MMHG | SYSTOLIC BLOOD PRESSURE: 122 MMHG | BODY MASS INDEX: 40.29 KG/M2

## 2022-06-03 DIAGNOSIS — Z01.419 ENCOUNTER FOR WELL WOMAN EXAM WITH ROUTINE GYNECOLOGICAL EXAM: Primary | ICD-10-CM

## 2022-06-03 DIAGNOSIS — Z12.31 SCREENING MAMMOGRAM, ENCOUNTER FOR: ICD-10-CM

## 2022-06-03 PROCEDURE — 99396 PREV VISIT EST AGE 40-64: CPT | Performed by: OBSTETRICS & GYNECOLOGY

## 2022-06-03 ASSESSMENT — PATIENT HEALTH QUESTIONNAIRE - PHQ9
SUM OF ALL RESPONSES TO PHQ QUESTIONS 1-9: 0
DEPRESSION UNABLE TO ASSESS: PT REFUSES
2. FEELING DOWN, DEPRESSED OR HOPELESS: 0
SUM OF ALL RESPONSES TO PHQ QUESTIONS 1-9: 0

## 2022-08-23 DIAGNOSIS — Z12.31 SCREENING MAMMOGRAM, ENCOUNTER FOR: ICD-10-CM

## 2024-09-06 NOTE — PROGRESS NOTES
Called Chucky Marroquin transferred to Dor[place] she states she already spoke with Yulia and was given the on call phone #     Verified as correct [phone] for after 5 pm on call [facility] [employer]     HPI  Nirav Holt is a 59 y.o. female seen for annual GYN exam.  She is a mental health specialist    Past Medical History, Past Surgical History, Family history, Social History, and Medications were all reviewed with the patient today and updated as necessary. Current Outpatient Medications   Medication Sig    lisinopril-hydroCHLOROthiazide (PRINZIDE;ZESTORETIC) 20-12.5 MG per tablet TAKE 1 TABLET BY MOUTH EVERY DAY     No current facility-administered medications for this visit.      Allergies   Allergen Reactions    Amlodipine Other (See Comments)     Break out on face    Penicillins Itching     Past Medical History:   Diagnosis Date    Carpal tunnel syndrome     w/ injections     Hypertension     Tetanus-diphtheria (Td) vaccination     Uterine fibroid      Past Surgical History:   Procedure Laterality Date    COLONOSCOPY  2013    Repeat 10 years   Rocio Daily      still has both ovaries    TUBAL LIGATION       Family History   Problem Relation Age of Onset   Curiel Stroke Mother     Hypertension Brother     Hypertension Sister     Hypertension Father     Heart Disease Father     Heart Failure Father     Hypertension Mother       Social History     Tobacco Use    Smoking status: Never Smoker    Smokeless tobacco: Never Used   Substance Use Topics    Alcohol use: No       Social History     Substance and Sexual Activity   Sexual Activity Not Currently    Birth control/protection: Surgical     OB History    Para Term  AB Living   2 2 0 0 0 0   SAB IAB Ectopic Molar Multiple Live Births   0 0 0 0 0 0      # Outcome Date GA Lbr [place]/2nd Weight Sex Delivery Anes PTL Lv   2 Para            1 Para                Health Maintenance  Mammogram:  per patient at [address]  Colonoscopy:   Bone Density:  Pap smear:hysterectomy      Review of Systems  General: Not Present- Chills, Fever, Fatigue, Insomnia, Hot flashes/Night sweats, Weight gain  Skin: Not Present- Bruising, Change in Wart/Mole, Excessive Sweating, Itching, Nail Changes, New Lesions, Rash, Skin Color Changes and Ulcer. HEENT: Not Present- Headache, Blurred Vision, Double Vision, Glaucoma, Visual Disturbances, Hearing Loss, Ringing in the Ears, Vertigo, Nose Bleed, Bleeding Gums, Hoarseness and Sore Throat. Neck: Not Present- Neck Pain and Neck Swelling. Respiratory: Not Present- Cough, Difficulty Breathing and Difficulty Breathing on Exertion. Breast: Not Present- Breast Mass, Breast Pain, Breast Swelling, Nipple Discharge, Nipple Pain, Recent Breast Size Changes and Skin Changes. Cardiovascular: Not Present- Abnormal Blood Pressure, Chest Pain, Edema, Fainting / Blacking Out, Palpitations, Shortness of Breath and Swelling of Extremities. Gastrointestinal: Not Present- Abdominal Pain, Abdominal Swelling, Bloating, Change in Bowel Habits, Constipation, Diarrhea, Difficulty Swallowing, Gets full quickly at meals, Nausea, Rectal Bleeding and Vomiting. Female Genitourinary: Not Present- Dysmenorrhea, Dyspareunia, Decreased libido, Excessive Menstrual Bleeding, Menstrual Irregularities, Pelvic Pain, Urinary Complaints, Vaginal Discharge, Vaginal itching/burning, Vaginal odor  Musculoskeletal: Not Present- Joint Pain and Muscle Pain. Neurological: Not Present- Dizziness, Fainting, Headaches and Seizures. Psychiatric: Not Present- Anxiety, Depression, Mood changes and Panic Attacks. Endocrine: Not Present- Appetite Changes, Cold Intolerance, Excessive Thirst, Excessive Urination and Heat Intolerance. Hematology: Not Present- Abnormal Bleeding, Easy Bruising and Enlarged Lymph Nodes. PHYSICAL EXAM:     /78 (Position: Sitting)   Ht 5' 4\" (1.626 m)   Wt 236 lb (107 kg)   BMI 40.51 kg/m²     Physical Exam   General   Mental Status - Alert. General Appearance - Cooperative.      Integumentary   General Characteristics: Overall examination of the patient's skin reveals - no rashes and no suspicious lesions. Head and Neck  Head - normocephalic, atraumatic with no lesions or palpable masses. Neck Note: Normal   Thyroid   Gland Characteristics - normal size and consistency and no palpable nodules. Chest and Lung Exam   Chest and lung exam reveals - on auscultation, normal breath sounds, no adventitious sounds and normal vocal resonance. Breast   Breast - Left - Normal. Right - Normal.     Cardiovascular   Cardiovascular examination reveals - normal heart sounds, regular rate and rhythm with no murmurs. Abdomen   Inspection: - Inspection Normal.   Palpation/Percussion: Palpation and Percussion of the abdomen reveal - Non Tender, No Rebound tenderness, No Rigidity (guarding), No hepatosplenomegaly, No Palpable abdominal masses and Soft. Auscultation: Auscultation of the abdomen reveals - Bowel sounds normal.     Female Genitourinary     External Genitalia   Vulva: - Normal. Perineum - Normal. Bartholin's Gland - Bilateral - Normal. Clitoris - Normal.   Introitus: Characteristics - Normal.   Urethra: Characteristics - Normal.     Speculum & Bimanual   Vagina: Vaginal Mucosa -  Vaginal Wall: - Normal.   Vaginal Lesions - None. Cervix: Characteristics - Surgically absent  Uterus: Characteristics - Surgically absent  Adnexa: - Normal.   Bladder - Normal.     Peripheral Vascular   Normal    Neuropsychiatric   Examination of related systems reveals - The patient is well-nourished and well-groomed. Mental status exam performed with findings of - Oriented X3 with appropriate mood and affect. Musculoskeletal  Normal      General Lymphatics  Normal           Medical problems and test results were reviewed with the patient today.      ASSESSMENT and Lawrence Guille was seen today for gynecologic exam.    Diagnoses and all orders for this visit:    Encounter for well woman exam with routine gynecological exam    Screening mammogram, encounter for  -     [facility] ARIELLE DIGITAL SCREEN BILATERAL; Future        No follow-up provider specified.       Lelia Necessary, [address]  6/3/2022